# Patient Record
Sex: MALE | Race: BLACK OR AFRICAN AMERICAN | NOT HISPANIC OR LATINO
[De-identification: names, ages, dates, MRNs, and addresses within clinical notes are randomized per-mention and may not be internally consistent; named-entity substitution may affect disease eponyms.]

---

## 2020-09-04 PROBLEM — Z00.00 ENCOUNTER FOR PREVENTIVE HEALTH EXAMINATION: Status: ACTIVE | Noted: 2020-09-04

## 2020-09-08 ENCOUNTER — APPOINTMENT (OUTPATIENT)
Dept: PEDIATRIC ALLERGY IMMUNOLOGY | Facility: CLINIC | Age: 30
End: 2020-09-08
Payer: COMMERCIAL

## 2020-09-08 ENCOUNTER — LABORATORY RESULT (OUTPATIENT)
Age: 30
End: 2020-09-08

## 2020-09-08 VITALS
WEIGHT: 237 LBS | BODY MASS INDEX: 35.1 KG/M2 | SYSTOLIC BLOOD PRESSURE: 139 MMHG | DIASTOLIC BLOOD PRESSURE: 84 MMHG | HEART RATE: 68 BPM | TEMPERATURE: 97.7 F | HEIGHT: 69 IN | OXYGEN SATURATION: 96 %

## 2020-09-08 DIAGNOSIS — J30.89 OTHER ALLERGIC RHINITIS: ICD-10-CM

## 2020-09-08 DIAGNOSIS — Z83.6 FAMILY HISTORY OF OTHER DISEASES OF THE RESPIRATORY SYSTEM: ICD-10-CM

## 2020-09-08 DIAGNOSIS — J30.1 ALLERGIC RHINITIS DUE TO POLLEN: ICD-10-CM

## 2020-09-08 PROCEDURE — 99204 OFFICE O/P NEW MOD 45 MIN: CPT | Mod: 25

## 2020-09-08 PROCEDURE — 95024 IQ TESTS W/ALLERGENIC XTRCS: CPT

## 2020-09-08 PROCEDURE — 95004 PERQ TESTS W/ALRGNC XTRCS: CPT

## 2020-09-08 PROCEDURE — 36415 COLL VENOUS BLD VENIPUNCTURE: CPT

## 2020-09-10 LAB
D FARINAE IGE QN: <0.1 KUA/L
D PTERONYSS IGE QN: 0.11 KUA/L
DEPRECATED D FARINAE IGE RAST QL: 0
DEPRECATED D PTERONYSS IGE RAST QL: NORMAL

## 2020-09-10 NOTE — HISTORY OF PRESENT ILLNESS
[Venom Reactions] : venom reactions [de-identified] : CAREY DÍAZ is a 30 year year  old male who presents for evaluation of environmental allergies:\par He reports allergic nasal and ocular symptoms; symptoms are worse in the spring and fall and on dog and cat exposure. With pet exposure, he develops wheezing, but haven't used short acting bronchodilator since about 14 years of age. He had childhood asthma and remembers being admitted for flu and asthma exacerbation as a child. He also had mild childhood eczema that resolved by his teen years.  . He tried Loratidine.\par \par Pineapple- mouth and tongue feel irritated\par Raw mushrooms- on one occasion when touched  developed itchy mouth and fainty feeling. He eats and tolerates cooked mushrroms. \par

## 2020-09-10 NOTE — PHYSICAL EXAM
[Alert] : alert [Well Nourished] : well nourished [Healthy Appearance] : healthy appearance [No Acute Distress] : no acute distress [Well Developed] : well developed [No Photophobia] : no photophobia [Conjunctival Erythema] : no conjunctival erythema [Sclera Not Icteric] : sclera not icteric [Normal Lips/Tongue] : the lips and tongue were normal [Normal TMs] : both tympanic membranes were normal [No Thrush] : no thrush [Normal Outer Ear/Nose] : the ears and nose were normal in appearance [No Oral Lesions or Ulcers] : no oral lesions or ulcers [Pale mucosa] : pale mucosa [Boggy Nasal Turbinates] : boggy and/or pale nasal turbinates [Pharyngeal erythema] : no pharyngeal erythema [Posterior Pharyngeal Cobblestoning] : posterior pharyngeal cobblestoning [Exudate] : no exudate [Clear Rhinorrhea] : clear rhinorrhea was seen [No Neck Mass] : no neck mass was observed [Normal Rate and Effort] : normal respiratory rhythm and effort [Supple] : the neck was supple [No Crackles] : no crackles [Normal Rate] : heart rate was normal  [Wheezing] : no wheezing was heard [Bilateral Audible Breath Sounds] : bilateral audible breath sounds [Normal S1, S2] : normal S1 and S2 [Regular Rhythm] : with a regular rhythm [Soft] : abdomen soft [Not Tender] : non-tender [No HSM] : no hepato-splenomegaly [Normal Axillary Lumph Nodes] : axillary [Normal Cervical Lymph Nodes] : cervical [Skin Intact] : skin intact  [No Rash] : no rash [Eczematous Patches] : no eczematous patches [No Joint Swelling or Erythema] : no joint swelling or erythema [No clubbing] : no clubbing [No Edema] : no edema [No Cyanosis] : no cyanosis [Normal Mood] : mood was normal [Normal Affect] : affect was normal [Alert, Awake, Oriented as Age-Appropriate] : alert, awake, oriented as age appropriate

## 2020-09-10 NOTE — REVIEW OF SYSTEMS
[Eye Discharge] : eye discharge [Eye Redness] : redness [Eye Itching] : itchy eyes [Rhinorrhea] : rhinorrhea [Post Nasal Drip] : post nasal drip [Nasal Congestion] : nasal congestion [Cough] : cough [Wheezing] : wheezing [Urticaria] : urticaria [Nl] : Gastrointestinal [SOB at Rest] : no shortness of breath at rest [SOB with Exertion] : no dyspnea on exertion [Nocturnal Awakening] : no nocturnal awakening with shortness of breath [Headache] : no headache [Atopic Dermatitis] : no atopic dermatitis [Pruritus] : no pruritus [Dry Skin] : no ~L dry skin [Swelling] : no swelling [Easy Bruising] : no tendency for easy bruising [Easy Bleeding] : no ~M tendency for easy bleeding [Recurrent Sinus Infections] : no recurrent sinus infections [Recurrent Throat Infections] : no recurrence of throat infections [Recurrent Bronchitis] : no recurrent bronchitis [Recurrent Ear Infections] : no recurrence or ear infections [Recurrent Skin Infections] : no recurrent skin infections [Recurrent Pneumonia] : no ~T recurrent pneumonia

## 2020-09-10 NOTE — IMPRESSION
[Allergy Testing Mixed Feathers] : feathers [Allergy Testing Dog] : dog [Allergy Testing Trees] : trees [Allergy Testing Weeds] : weeds [] : molds [Allergy Testing Grasses] : grasses [Allergy Testing Cockroach] : cockroach [Allergy Testing Dust Mite] : dust mites [FreeTextEntry3] : cat, mold mix A and negative to dust mites [Allergy Testing Cat] : cat

## 2020-10-08 ENCOUNTER — APPOINTMENT (OUTPATIENT)
Dept: PEDIATRIC ALLERGY IMMUNOLOGY | Facility: CLINIC | Age: 30
End: 2020-10-08
Payer: COMMERCIAL

## 2020-10-08 PROCEDURE — 95117 IMMUNOTHERAPY INJECTIONS: CPT

## 2020-10-08 PROCEDURE — 95165 ANTIGEN THERAPY SERVICES: CPT

## 2020-10-12 ENCOUNTER — APPOINTMENT (OUTPATIENT)
Dept: PEDIATRIC ALLERGY IMMUNOLOGY | Facility: CLINIC | Age: 30
End: 2020-10-12
Payer: COMMERCIAL

## 2020-10-12 PROCEDURE — 95165 ANTIGEN THERAPY SERVICES: CPT

## 2020-10-12 PROCEDURE — 95117 IMMUNOTHERAPY INJECTIONS: CPT

## 2020-10-21 ENCOUNTER — APPOINTMENT (OUTPATIENT)
Dept: PEDIATRIC ALLERGY IMMUNOLOGY | Facility: CLINIC | Age: 30
End: 2020-10-21
Payer: COMMERCIAL

## 2020-10-21 DIAGNOSIS — J30.9 ALLERGIC RHINITIS, UNSPECIFIED: ICD-10-CM

## 2020-10-21 PROCEDURE — 95165 ANTIGEN THERAPY SERVICES: CPT

## 2020-10-21 PROCEDURE — 99072 ADDL SUPL MATRL&STAF TM PHE: CPT

## 2020-10-21 PROCEDURE — 95117 IMMUNOTHERAPY INJECTIONS: CPT

## 2020-10-28 ENCOUNTER — APPOINTMENT (OUTPATIENT)
Dept: PEDIATRIC ALLERGY IMMUNOLOGY | Facility: CLINIC | Age: 30
End: 2020-10-28

## 2020-11-03 ENCOUNTER — TRANSCRIPTION ENCOUNTER (OUTPATIENT)
Age: 30
End: 2020-11-03

## 2020-11-11 ENCOUNTER — APPOINTMENT (OUTPATIENT)
Dept: PEDIATRIC ALLERGY IMMUNOLOGY | Facility: CLINIC | Age: 30
End: 2020-11-11
Payer: COMMERCIAL

## 2020-11-11 PROCEDURE — 95117 IMMUNOTHERAPY INJECTIONS: CPT

## 2020-11-11 PROCEDURE — 95165 ANTIGEN THERAPY SERVICES: CPT

## 2020-11-19 ENCOUNTER — APPOINTMENT (OUTPATIENT)
Dept: PEDIATRIC ALLERGY IMMUNOLOGY | Facility: CLINIC | Age: 30
End: 2020-11-19
Payer: COMMERCIAL

## 2020-11-19 PROCEDURE — 95117 IMMUNOTHERAPY INJECTIONS: CPT

## 2020-11-19 PROCEDURE — 95165 ANTIGEN THERAPY SERVICES: CPT

## 2021-03-24 ENCOUNTER — APPOINTMENT (OUTPATIENT)
Dept: PEDIATRIC ALLERGY IMMUNOLOGY | Facility: CLINIC | Age: 31
End: 2021-03-24
Payer: COMMERCIAL

## 2021-03-24 VITALS — WEIGHT: 225.4 LBS | HEART RATE: 81 BPM | BODY MASS INDEX: 33.29 KG/M2

## 2021-03-24 DIAGNOSIS — J30.89 OTHER ALLERGIC RHINITIS: ICD-10-CM

## 2021-03-24 DIAGNOSIS — J30.81 ALLERGIC RHINITIS DUE TO ANIMAL (CAT) (DOG) HAIR AND DANDER: ICD-10-CM

## 2021-03-24 DIAGNOSIS — J30.1 ALLERGIC RHINITIS DUE TO POLLEN: ICD-10-CM

## 2021-03-24 DIAGNOSIS — R06.2 WHEEZING: ICD-10-CM

## 2021-03-24 DIAGNOSIS — H10.13 ACUTE ATOPIC CONJUNCTIVITIS, BILATERAL: ICD-10-CM

## 2021-03-24 DIAGNOSIS — Z51.6 ENCOUNTER FOR DESENSITIZATION TO ALLERGENS: ICD-10-CM

## 2021-03-24 PROCEDURE — 95165 ANTIGEN THERAPY SERVICES: CPT

## 2021-03-24 PROCEDURE — 99213 OFFICE O/P EST LOW 20 MIN: CPT | Mod: 25

## 2021-03-24 RX ORDER — ALBUTEROL SULFATE 90 UG/1
108 (90 BASE) INHALANT RESPIRATORY (INHALATION)
Qty: 1 | Refills: 1 | Status: ACTIVE | COMMUNITY
Start: 2020-09-08

## 2021-03-24 RX ORDER — FLUTICASONE PROPIONATE 50 UG/1
50 SPRAY, METERED NASAL DAILY
Qty: 1 | Refills: 1 | Status: ACTIVE | COMMUNITY
Start: 2020-09-08 | End: 1900-01-01

## 2021-03-26 PROBLEM — Z51.6 NEED FOR DESENSITIZATION TO ALLERGENS: Status: ACTIVE | Noted: 2020-10-08

## 2021-03-26 NOTE — HISTORY OF PRESENT ILLNESS
[Venom Reactions] : venom reactions [de-identified] : CAREY DÍAZ is a 30 year year  old male with Allergic Rhinoconjunctivitis and intermittent asthma:\par \par - he started allergen immunotherapy 9/2020 and continued until 11/2020. He stopped because of problems with insurance, but reports significant improvement in his allergic symptoms on dog exposure even with these low doses. He wants to restart IT ASAP. \par - occasional short acting bronchodilator use,\par \par INITIAL HISTORY: \par He reports allergic nasal and ocular symptoms; symptoms are worse in the spring and fall and on dog and cat exposure. With pet exposure, he develops wheezing, but haven't used short acting bronchodilator since about 14 years of age. He had childhood asthma and remembers being admitted for flu and asthma exacerbation as a child. He also had mild childhood eczema that resolved by his teen years.  . He tried Loratidine.\par \par Pineapple- mouth and tongue feel irritated\par Raw mushrooms- on one occasion when touched  developed itchy mouth and fainty feeling. He eats and tolerates cooked mushrroms. \par  [> or = 20] : > than or = 20 [FreeTextEntry7] : 27

## 2021-03-26 NOTE — PHYSICAL EXAM
[Alert] : alert [Well Nourished] : well nourished [No Acute Distress] : no acute distress [Normal Voice/Communication] : normal voice communication [Sclera Not Icteric] : sclera not icteric [Conjunctival Erythema] : no conjunctival erythema [No Thrush] : no thrush [Boggy Nasal Turbinates] : boggy and/or pale nasal turbinates [Posterior Pharyngeal Cobblestoning] : posterior pharyngeal cobblestoning [Clear Rhinorrhea] : clear rhinorrhea was seen [Exudate] : no exudate [No Neck Mass] : no neck mass was observed [Normal Rate and Effort] : normal respiratory rhythm and effort [No Crackles] : no crackles [Bilateral Audible Breath Sounds] : bilateral audible breath sounds [Wheezing] : no wheezing was heard [Normal Rate] : heart rate was normal  [Regular Rhythm] : with a regular rhythm [Soft] : abdomen soft [Not Tender] : non-tender [Normal Cervical Lymph Nodes] : cervical [No Rash] : no rash [Patches] : no patches [No clubbing] : no clubbing [No Cyanosis] : no cyanosis [Normal Mood] : mood was normal [Normal Affect] : affect was normal [Alert, Awake, Oriented as Age-Appropriate] : alert, awake, oriented as age appropriate

## 2021-07-08 NOTE — REVIEW OF SYSTEMS
[Rhinorrhea] : rhinorrhea [Nasal Congestion] : nasal congestion [Post Nasal Drip] : post nasal drip [Nl] : Hematologic/Lymphatic [FreeTextEntry3] : seasonal [FreeTextEntry4] : seasonal and on pet sxposure No

## 2021-11-08 NOTE — REASON FOR VISIT
show [Routine Follow-Up] : a routine follow-up visit for [Allergy Evaluation/ Skin Testing] : allergy evaluation and or skin testing